# Patient Record
(demographics unavailable — no encounter records)

---

## 2024-12-03 NOTE — PROCEDURE
[de-identified] : Indication: requirement for exam not possible via anterior examination; hoarse voice, cough, reflux After verbal consent and the administration of an aerosolized oxymetazoline/lidocaine mix, examination was performed with a flexible endoscope placed through the nose which was attached to a video monitoring system. Findings: Nasopharynx: unremarkable Soft palate, lateral and posterior pharyngeal walls: unremarkable Base of tongue & lingual tonsil: minimal retrodisplacement Vallecula: unremarkable Epiglottis: unremarkable Piriform sinuses and pharyngoesophageal junction: unremarkable Arytenoids and AE folds: moderate interarytenoid edema Ventricle and false vocal folds: unremarkable True vocal folds: early anterior third nodules; surface without ectasias or edema; normal movement bilaterally with good apposition in phonation Visible subglottis: unremarkable Narrow-band imaging: not used Other findings: ELM

## 2024-12-03 NOTE — HISTORY OF PRESENT ILLNESS
[de-identified] : URI 3 wks- congestion went away but a croupy cough remains. This is worse at night and in the morning. His pediatrician gave him a course of ? steroid with no improvement. No apparent dysphagia but some dysphonia. Gaining weight. No wheezing or asthma hx. Hx of a lot of reflux which has improved over time.  He snores only with nasal congestion.

## 2024-12-03 NOTE — ASSESSMENT
[FreeTextEntry1] : We discussed reflux at least contributing to his cough; We reviewed reflux precautions and the patient was provided with the corresponding educational handout. Follow up 1 month sooner prn

## 2024-12-03 NOTE — PHYSICAL EXAM
[Laryngoscopy Performed] : laryngoscopy was performed, see procedure section for findings [Normal] : ausculatation of lungs is normal